# Patient Record
Sex: FEMALE | Race: WHITE | Employment: UNEMPLOYED | URBAN - METROPOLITAN AREA
[De-identification: names, ages, dates, MRNs, and addresses within clinical notes are randomized per-mention and may not be internally consistent; named-entity substitution may affect disease eponyms.]

---

## 2017-10-24 ENCOUNTER — HOSPITAL ENCOUNTER (OUTPATIENT)
Dept: MRI IMAGING | Age: 32
Discharge: HOME | End: 2017-10-24
Attending: INTERNAL MEDICINE
Payer: COMMERCIAL

## 2017-10-24 ENCOUNTER — LAB ENCOUNTER (OUTPATIENT)
Dept: LAB | Age: 32
End: 2017-10-24
Attending: INTERNAL MEDICINE
Payer: COMMERCIAL

## 2017-10-24 DIAGNOSIS — K86.1 CHRONIC PANCREATITIS, UNSPECIFIED PANCREATITIS TYPE (HCC): ICD-10-CM

## 2017-10-24 DIAGNOSIS — R10.84 GENERALIZED ABDOMINAL PAIN: ICD-10-CM

## 2017-10-24 PROCEDURE — 74183 MRI ABD W/O CNTR FLWD CNTR: CPT | Performed by: INTERNAL MEDICINE

## 2017-10-24 PROCEDURE — 83516 IMMUNOASSAY NONANTIBODY: CPT

## 2017-10-24 PROCEDURE — 86255 FLUORESCENT ANTIBODY SCREEN: CPT

## 2017-10-24 PROCEDURE — 82787 IGG 1 2 3 OR 4 EACH: CPT

## 2017-10-24 PROCEDURE — A9575 INJ GADOTERATE MEGLUMI 0.1ML: HCPCS | Performed by: INTERNAL MEDICINE

## 2017-10-24 PROCEDURE — 36415 COLL VENOUS BLD VENIPUNCTURE: CPT

## 2017-10-24 PROCEDURE — 82784 ASSAY IGA/IGD/IGG/IGM EACH: CPT

## 2017-10-24 PROCEDURE — 76376 3D RENDER W/INTRP POSTPROCES: CPT | Performed by: INTERNAL MEDICINE

## 2017-10-24 PROCEDURE — 80053 COMPREHEN METABOLIC PANEL: CPT

## 2017-10-24 PROCEDURE — 85025 COMPLETE CBC W/AUTO DIFF WBC: CPT

## 2017-10-26 NOTE — PROGRESS NOTES
To: Juliann Swanson  : 83    Date: 10/26/17    I hope this letter finds you doing well. I am writing to inform you of the following:          The results of your recent lab tests were normal.     The MRI showed : 1. Stable pericardial cyst within th

## 2017-11-01 PROCEDURE — 87491 CHLMYD TRACH DNA AMP PROBE: CPT | Performed by: OBSTETRICS & GYNECOLOGY

## 2017-11-01 PROCEDURE — 88175 CYTOPATH C/V AUTO FLUID REDO: CPT | Performed by: OBSTETRICS & GYNECOLOGY

## 2017-11-01 PROCEDURE — 87591 N.GONORRHOEAE DNA AMP PROB: CPT | Performed by: OBSTETRICS & GYNECOLOGY

## 2018-12-21 PROCEDURE — 86900 BLOOD TYPING SEROLOGIC ABO: CPT | Performed by: EMERGENCY MEDICINE

## 2018-12-21 PROCEDURE — 86901 BLOOD TYPING SEROLOGIC RH(D): CPT | Performed by: EMERGENCY MEDICINE

## 2019-01-07 PROCEDURE — 86850 RBC ANTIBODY SCREEN: CPT | Performed by: OBSTETRICS & GYNECOLOGY

## 2019-01-07 PROCEDURE — 87086 URINE CULTURE/COLONY COUNT: CPT | Performed by: OBSTETRICS & GYNECOLOGY

## 2019-01-07 PROCEDURE — 88175 CYTOPATH C/V AUTO FLUID REDO: CPT | Performed by: OBSTETRICS & GYNECOLOGY

## 2019-01-07 PROCEDURE — 86762 RUBELLA ANTIBODY: CPT | Performed by: OBSTETRICS & GYNECOLOGY

## 2019-01-07 PROCEDURE — 86901 BLOOD TYPING SEROLOGIC RH(D): CPT | Performed by: OBSTETRICS & GYNECOLOGY

## 2019-01-07 PROCEDURE — 87389 HIV-1 AG W/HIV-1&-2 AB AG IA: CPT | Performed by: OBSTETRICS & GYNECOLOGY

## 2019-01-07 PROCEDURE — 86900 BLOOD TYPING SEROLOGIC ABO: CPT | Performed by: OBSTETRICS & GYNECOLOGY

## 2019-01-07 PROCEDURE — 86780 TREPONEMA PALLIDUM: CPT | Performed by: OBSTETRICS & GYNECOLOGY

## 2019-01-21 PROBLEM — Z67.91 RH NEGATIVE, ANTEPARTUM: Status: ACTIVE | Noted: 2019-01-21

## 2019-01-21 PROBLEM — E07.9 THYROID DYSFUNCTION IN PREGNANCY: Status: ACTIVE | Noted: 2019-01-21

## 2019-01-21 PROBLEM — E07.9 THYROID DYSFUNCTION IN PREGNANCY (HCC): Status: ACTIVE | Noted: 2019-01-21

## 2019-01-21 PROBLEM — Z34.00 ENCOUNTER FOR SUPERVISION PREGNANCY IN PRIMIGRAVIDA, ANTEPARTUM (HCC): Status: ACTIVE | Noted: 2019-01-21

## 2019-01-21 PROBLEM — O99.280 THYROID DYSFUNCTION IN PREGNANCY (HCC): Status: ACTIVE | Noted: 2019-01-21

## 2019-01-21 PROBLEM — Z67.91 RH NEGATIVE, ANTEPARTUM (HCC): Status: ACTIVE | Noted: 2019-01-21

## 2019-01-21 PROBLEM — O99.280 THYROID DYSFUNCTION IN PREGNANCY: Status: ACTIVE | Noted: 2019-01-21

## 2019-01-21 PROBLEM — O26.899 RH NEGATIVE, ANTEPARTUM (HCC): Status: ACTIVE | Noted: 2019-01-21

## 2019-01-21 PROBLEM — O26.899 RH NEGATIVE, ANTEPARTUM: Status: ACTIVE | Noted: 2019-01-21

## 2019-01-21 PROBLEM — Z34.00 ENCOUNTER FOR SUPERVISION PREGNANCY IN PRIMIGRAVIDA, ANTEPARTUM: Status: ACTIVE | Noted: 2019-01-21

## 2019-01-21 PROBLEM — O99.210 OBESITY AFFECTING PREGNANCY, ANTEPARTUM: Status: ACTIVE | Noted: 2019-01-21

## 2019-01-21 PROBLEM — O99.210 OBESITY AFFECTING PREGNANCY, ANTEPARTUM (HCC): Status: ACTIVE | Noted: 2019-01-21

## 2019-02-12 PROCEDURE — 81508 FTL CGEN ABNOR TWO PROTEINS: CPT | Performed by: OBSTETRICS & GYNECOLOGY

## 2019-02-12 PROCEDURE — 36415 COLL VENOUS BLD VENIPUNCTURE: CPT | Performed by: OBSTETRICS & GYNECOLOGY

## 2019-02-14 ENCOUNTER — TELEPHONE (OUTPATIENT)
Dept: PERINATAL CARE | Facility: HOSPITAL | Age: 34
End: 2019-02-14

## 2019-02-14 NOTE — TELEPHONE ENCOUNTER
Called patient to review FTS results. She was unavailable so I left a voicemail to call our office back. I did NOT leave the results on her voicemail as the T-21 risk is elevated (1:206). Carry Rex. Dorrine Hammans, M.D.   Maternal-Fetal Medicine

## 2019-03-18 PROCEDURE — 82105 ALPHA-FETOPROTEIN SERUM: CPT | Performed by: OBSTETRICS & GYNECOLOGY

## 2019-03-18 PROCEDURE — 36415 COLL VENOUS BLD VENIPUNCTURE: CPT | Performed by: OBSTETRICS & GYNECOLOGY

## 2019-05-25 PROCEDURE — 87389 HIV-1 AG W/HIV-1&-2 AB AG IA: CPT | Performed by: OBSTETRICS & GYNECOLOGY

## 2019-08-26 PROBLEM — O26.899 RH NEGATIVE, ANTEPARTUM (HCC): Status: RESOLVED | Noted: 2019-01-21 | Resolved: 2019-08-24

## 2019-08-26 PROBLEM — Z34.00 ENCOUNTER FOR SUPERVISION PREGNANCY IN PRIMIGRAVIDA, ANTEPARTUM: Status: RESOLVED | Noted: 2019-01-21 | Resolved: 2019-08-24

## 2019-08-26 PROBLEM — E07.9 THYROID DYSFUNCTION IN PREGNANCY: Status: RESOLVED | Noted: 2019-01-21 | Resolved: 2019-08-24

## 2019-08-26 PROBLEM — O99.280 THYROID DYSFUNCTION IN PREGNANCY: Status: RESOLVED | Noted: 2019-01-21 | Resolved: 2019-08-24

## 2019-08-26 PROBLEM — O99.210 OBESITY AFFECTING PREGNANCY, ANTEPARTUM: Status: RESOLVED | Noted: 2019-01-21 | Resolved: 2019-08-24

## 2019-08-26 PROBLEM — Z67.91 RH NEGATIVE, ANTEPARTUM: Status: RESOLVED | Noted: 2019-01-21 | Resolved: 2019-08-24

## 2019-08-26 PROBLEM — O26.899 RH NEGATIVE, ANTEPARTUM: Status: RESOLVED | Noted: 2019-01-21 | Resolved: 2019-08-24

## 2019-08-26 PROBLEM — E07.9 THYROID DYSFUNCTION IN PREGNANCY (HCC): Status: RESOLVED | Noted: 2019-01-21 | Resolved: 2019-08-24

## 2019-08-26 PROBLEM — Z34.00 ENCOUNTER FOR SUPERVISION PREGNANCY IN PRIMIGRAVIDA, ANTEPARTUM (HCC): Status: RESOLVED | Noted: 2019-01-21 | Resolved: 2019-08-24

## 2019-08-26 PROBLEM — O99.280 THYROID DYSFUNCTION IN PREGNANCY (HCC): Status: RESOLVED | Noted: 2019-01-21 | Resolved: 2019-08-24

## 2019-08-26 PROBLEM — Z67.91 RH NEGATIVE, ANTEPARTUM (HCC): Status: RESOLVED | Noted: 2019-01-21 | Resolved: 2019-08-24

## 2019-08-26 PROBLEM — O99.210 OBESITY AFFECTING PREGNANCY, ANTEPARTUM (HCC): Status: RESOLVED | Noted: 2019-01-21 | Resolved: 2019-08-24

## 2020-07-21 ENCOUNTER — HOSPITAL ENCOUNTER (OUTPATIENT)
Age: 35
Discharge: HOME | End: 2020-07-21
Payer: COMMERCIAL

## 2020-07-21 VITALS
HEART RATE: 85 BPM | BODY MASS INDEX: 32.93 KG/M2 | TEMPERATURE: 98 F | WEIGHT: 230 LBS | DIASTOLIC BLOOD PRESSURE: 84 MMHG | SYSTOLIC BLOOD PRESSURE: 126 MMHG | RESPIRATION RATE: 20 BRPM | OXYGEN SATURATION: 99 % | HEIGHT: 70 IN

## 2020-07-21 DIAGNOSIS — J01.10 ACUTE NON-RECURRENT FRONTAL SINUSITIS: Primary | ICD-10-CM

## 2020-07-21 PROCEDURE — 99204 OFFICE O/P NEW MOD 45 MIN: CPT

## 2020-07-21 PROCEDURE — 99213 OFFICE O/P EST LOW 20 MIN: CPT

## 2020-07-21 RX ORDER — AMOXICILLIN AND CLAVULANATE POTASSIUM 875; 125 MG/1; MG/1
1 TABLET, FILM COATED ORAL 2 TIMES DAILY
Qty: 20 TABLET | Refills: 0 | Status: SHIPPED | OUTPATIENT
Start: 2020-07-21 | End: 2020-07-31

## 2020-07-21 NOTE — ED PROVIDER NOTES
Patient Seen in: [facility] [place] [facility]      History   Patient presents with:  Ear Pain    Stated Complaint: L ear pain x 3 days    HPI    41-year-old female presents to the clinic for evaluation of left ear pain for 3 days.   Patient also r Smokeless tobacco: Never Used      Tobacco comment: smoked less than half a pack per day    Alcohol use: No      Alcohol/week: 0.0 standard drinks    Drug use:  No             Review of Systems    Positive for stated complaint: L ear pain x 3 days  Other days.  Currently afebrile nontoxic-appearing. Minimal tenderness on exam.  Informed patient that this is likely a viral infection. Discussed with patient that usually antibiotics to use after 7 to 10 days of symptoms with no improvement or fever.   She ve

## 2020-07-22 PROCEDURE — 88305 TISSUE EXAM BY PATHOLOGIST: CPT | Performed by: OBSTETRICS & GYNECOLOGY

## 2020-11-23 PROBLEM — N20.0 NEPHROLITHIASIS: Status: ACTIVE | Noted: 2020-11-23

## 2021-11-04 PROBLEM — Z67.91 RH NEGATIVE STATE IN ANTEPARTUM PERIOD (HCC): Status: ACTIVE | Noted: 2021-11-04

## 2021-11-04 PROBLEM — O26.899 RH NEGATIVE STATE IN ANTEPARTUM PERIOD (HCC): Status: ACTIVE | Noted: 2021-11-04

## 2021-11-04 PROBLEM — Z67.91 RH NEGATIVE STATE IN ANTEPARTUM PERIOD: Status: ACTIVE | Noted: 2021-11-04

## 2021-11-04 PROBLEM — O26.899 RH NEGATIVE STATE IN ANTEPARTUM PERIOD: Status: ACTIVE | Noted: 2021-11-04

## 2021-11-04 PROBLEM — O09.529 ANTEPARTUM MULTIGRAVIDA OF ADVANCED MATERNAL AGE: Status: ACTIVE | Noted: 2021-11-04

## 2021-11-04 PROBLEM — O09.529 ANTEPARTUM MULTIGRAVIDA OF ADVANCED MATERNAL AGE (HCC): Status: ACTIVE | Noted: 2021-11-04

## 2022-03-10 PROBLEM — D80.3: Status: ACTIVE | Noted: 2022-03-10

## 2022-03-10 PROBLEM — M54.6 CHRONIC THORACIC BACK PAIN: Status: ACTIVE | Noted: 2022-03-10

## 2022-03-10 PROBLEM — G89.29 CHRONIC THORACIC BACK PAIN: Status: ACTIVE | Noted: 2022-03-10

## 2022-08-16 ENCOUNTER — HOSPITAL ENCOUNTER (OUTPATIENT)
Age: 37
Discharge: HOME | End: 2022-08-16
Payer: COMMERCIAL

## 2022-08-16 ENCOUNTER — APPOINTMENT (OUTPATIENT)
Dept: GENERAL RADIOLOGY | Age: 37
End: 2022-08-16
Attending: NURSE PRACTITIONER
Payer: COMMERCIAL

## 2022-08-16 VITALS
RESPIRATION RATE: 16 BRPM | DIASTOLIC BLOOD PRESSURE: 74 MMHG | SYSTOLIC BLOOD PRESSURE: 124 MMHG | HEIGHT: 70 IN | TEMPERATURE: 98 F | OXYGEN SATURATION: 99 % | WEIGHT: 239 LBS | BODY MASS INDEX: 34.22 KG/M2 | HEART RATE: 59 BPM

## 2022-08-16 DIAGNOSIS — S09.92XA INJURY OF NOSE, INITIAL ENCOUNTER: Primary | ICD-10-CM

## 2022-08-16 PROCEDURE — 70160 X-RAY EXAM OF NASAL BONES: CPT | Performed by: NURSE PRACTITIONER

## 2022-08-16 PROCEDURE — 99213 OFFICE O/P EST LOW 20 MIN: CPT

## 2022-08-16 NOTE — ED INITIAL ASSESSMENT (HPI)
Patient presents to IC with c/o \"head butt\"to left bridge of nose from her 1 yr old about 2 hours ago. No LOC noted. Patient states she did hear a \"crunch-like\"sound when it occurred.

## 2023-09-15 ENCOUNTER — APPOINTMENT (OUTPATIENT)
Dept: ULTRASOUND IMAGING | Age: 38
End: 2023-09-15
Attending: EMERGENCY MEDICINE
Payer: COMMERCIAL

## 2023-09-15 ENCOUNTER — APPOINTMENT (OUTPATIENT)
Dept: CT IMAGING | Age: 38
End: 2023-09-15
Attending: EMERGENCY MEDICINE
Payer: COMMERCIAL

## 2023-09-15 ENCOUNTER — HOSPITAL ENCOUNTER (EMERGENCY)
Age: 38
Discharge: HOME | End: 2023-09-15
Attending: EMERGENCY MEDICINE
Payer: COMMERCIAL

## 2023-09-15 VITALS
HEIGHT: 70 IN | TEMPERATURE: 98 F | SYSTOLIC BLOOD PRESSURE: 115 MMHG | RESPIRATION RATE: 16 BRPM | WEIGHT: 230 LBS | OXYGEN SATURATION: 98 % | HEART RATE: 65 BPM | DIASTOLIC BLOOD PRESSURE: 70 MMHG | BODY MASS INDEX: 32.93 KG/M2

## 2023-09-15 DIAGNOSIS — N83.202 CYST OF LEFT OVARY: Primary | ICD-10-CM

## 2023-09-15 DIAGNOSIS — K52.9 COLITIS: ICD-10-CM

## 2023-09-15 LAB
ALBUMIN SERPL-MCNC: 3.4 G/DL (ref 3.4–5)
ALBUMIN/GLOB SERPL: 0.9 {RATIO} (ref 1–2)
ALP LIVER SERPL-CCNC: 64 U/L
ALT SERPL-CCNC: 20 U/L
ANION GAP SERPL CALC-SCNC: 2 MMOL/L (ref 0–18)
AST SERPL-CCNC: 18 U/L (ref 15–37)
B-HCG UR QL: NEGATIVE
BASOPHILS # BLD AUTO: 0.01 X10(3) UL (ref 0–0.2)
BASOPHILS NFR BLD AUTO: 0.3 %
BILIRUB SERPL-MCNC: 0.5 MG/DL (ref 0.1–2)
BILIRUB UR QL STRIP.AUTO: NEGATIVE
BUN BLD-MCNC: 12 MG/DL (ref 7–18)
CALCIUM BLD-MCNC: 8.9 MG/DL (ref 8.5–10.1)
CHLORIDE SERPL-SCNC: 111 MMOL/L (ref 98–112)
CLARITY UR REFRACT.AUTO: CLEAR
CO2 SERPL-SCNC: 27 MMOL/L (ref 21–32)
COLOR UR AUTO: YELLOW
CREAT BLD-MCNC: 0.68 MG/DL
EGFRCR SERPLBLD CKD-EPI 2021: 114 ML/MIN/1.73M2 (ref 60–?)
EOSINOPHIL # BLD AUTO: 0.05 X10(3) UL (ref 0–0.7)
EOSINOPHIL NFR BLD AUTO: 1.6 %
ERYTHROCYTE [DISTWIDTH] IN BLOOD BY AUTOMATED COUNT: 12.5 %
GLOBULIN PLAS-MCNC: 3.6 G/DL (ref 2.8–4.4)
GLUCOSE BLD-MCNC: 102 MG/DL (ref 70–99)
GLUCOSE UR STRIP.AUTO-MCNC: NEGATIVE MG/DL
HCT VFR BLD AUTO: 40.7 %
HGB BLD-MCNC: 13.8 G/DL
IMM GRANULOCYTES # BLD AUTO: 0 X10(3) UL (ref 0–1)
IMM GRANULOCYTES NFR BLD: 0 %
KETONES UR STRIP.AUTO-MCNC: NEGATIVE MG/DL
LIPASE SERPL-CCNC: 133 U/L (ref 13–75)
LYMPHOCYTES # BLD AUTO: 1.22 X10(3) UL (ref 1–4)
LYMPHOCYTES NFR BLD AUTO: 39.4 %
MCH RBC QN AUTO: 30.9 PG (ref 26–34)
MCHC RBC AUTO-ENTMCNC: 33.9 G/DL (ref 31–37)
MCV RBC AUTO: 91.3 FL
MONOCYTES # BLD AUTO: 0.36 X10(3) UL (ref 0.1–1)
MONOCYTES NFR BLD AUTO: 11.6 %
NEUTROPHILS # BLD AUTO: 1.46 X10 (3) UL (ref 1.5–7.7)
NEUTROPHILS # BLD AUTO: 1.46 X10(3) UL (ref 1.5–7.7)
NEUTROPHILS NFR BLD AUTO: 47.1 %
NITRITE UR QL STRIP.AUTO: NEGATIVE
OSMOLALITY SERPL CALC.SUM OF ELEC: 290 MOSM/KG (ref 275–295)
PH UR STRIP.AUTO: 7.5 [PH] (ref 5–8)
PLATELET # BLD AUTO: 232 10(3)UL (ref 150–450)
POTASSIUM SERPL-SCNC: 4.6 MMOL/L (ref 3.5–5.1)
PROT SERPL-MCNC: 7 G/DL (ref 6.4–8.2)
PROT UR STRIP.AUTO-MCNC: NEGATIVE MG/DL
RBC # BLD AUTO: 4.46 X10(6)UL
RBC UR QL AUTO: NEGATIVE
SODIUM SERPL-SCNC: 140 MMOL/L (ref 136–145)
SP GR UR STRIP.AUTO: 1.02 (ref 1–1.03)
UROBILINOGEN UR STRIP.AUTO-MCNC: 0.2 MG/DL
WBC # BLD AUTO: 3.1 X10(3) UL (ref 4–11)

## 2023-09-15 PROCEDURE — 99285 EMERGENCY DEPT VISIT HI MDM: CPT

## 2023-09-15 PROCEDURE — 74177 CT ABD & PELVIS W/CONTRAST: CPT | Performed by: EMERGENCY MEDICINE

## 2023-09-15 PROCEDURE — 83690 ASSAY OF LIPASE: CPT

## 2023-09-15 PROCEDURE — 80053 COMPREHEN METABOLIC PANEL: CPT | Performed by: EMERGENCY MEDICINE

## 2023-09-15 PROCEDURE — 93975 VASCULAR STUDY: CPT | Performed by: EMERGENCY MEDICINE

## 2023-09-15 PROCEDURE — 76856 US EXAM PELVIC COMPLETE: CPT | Performed by: EMERGENCY MEDICINE

## 2023-09-15 PROCEDURE — 81001 URINALYSIS AUTO W/SCOPE: CPT | Performed by: EMERGENCY MEDICINE

## 2023-09-15 PROCEDURE — 96361 HYDRATE IV INFUSION ADD-ON: CPT

## 2023-09-15 PROCEDURE — 96375 TX/PRO/DX INJ NEW DRUG ADDON: CPT

## 2023-09-15 PROCEDURE — 83690 ASSAY OF LIPASE: CPT | Performed by: EMERGENCY MEDICINE

## 2023-09-15 PROCEDURE — 81001 URINALYSIS AUTO W/SCOPE: CPT

## 2023-09-15 PROCEDURE — 81025 URINE PREGNANCY TEST: CPT

## 2023-09-15 PROCEDURE — 80053 COMPREHEN METABOLIC PANEL: CPT

## 2023-09-15 PROCEDURE — 85025 COMPLETE CBC W/AUTO DIFF WBC: CPT

## 2023-09-15 PROCEDURE — 81015 MICROSCOPIC EXAM OF URINE: CPT

## 2023-09-15 PROCEDURE — 96374 THER/PROPH/DIAG INJ IV PUSH: CPT

## 2023-09-15 PROCEDURE — 85025 COMPLETE CBC W/AUTO DIFF WBC: CPT | Performed by: EMERGENCY MEDICINE

## 2023-09-15 RX ORDER — KETOROLAC TROMETHAMINE 15 MG/ML
30 INJECTION, SOLUTION INTRAMUSCULAR; INTRAVENOUS ONCE
Status: COMPLETED | OUTPATIENT
Start: 2023-09-15 | End: 2023-09-15

## 2023-09-15 RX ORDER — ONDANSETRON 2 MG/ML
4 INJECTION INTRAMUSCULAR; INTRAVENOUS ONCE
Status: COMPLETED | OUTPATIENT
Start: 2023-09-15 | End: 2023-09-15

## 2023-09-15 RX ORDER — CETIRIZINE HYDROCHLORIDE 10 MG/1
10 TABLET ORAL DAILY
COMMUNITY

## 2023-09-15 NOTE — DISCHARGE INSTRUCTIONS
Drink plenty of fluids. You can use anti-inflammatories such as ibuprofen for the pain from the ovarian cyst.  Return if you develop any worrisome symptoms such as inability to stay hydrated, severe intractable pain, etc. otherwise follow-up with your OB/GYN and PCP as an outpatient.

## 2024-03-13 ENCOUNTER — NURSE ONLY (OUTPATIENT)
Dept: HEMATOLOGY/ONCOLOGY | Age: 39
End: 2024-03-13
Attending: GENETIC COUNSELOR, MS
Payer: COMMERCIAL

## 2024-03-13 ENCOUNTER — GENETICS ENCOUNTER (OUTPATIENT)
Dept: GENETICS | Age: 39
End: 2024-03-13
Attending: GENETIC COUNSELOR, MS
Payer: COMMERCIAL

## 2024-03-13 DIAGNOSIS — Z80.3 FAMILY HISTORY OF BREAST CANCER: Primary | ICD-10-CM

## 2024-03-13 PROCEDURE — 36415 COLL VENOUS BLD VENIPUNCTURE: CPT

## 2024-03-13 PROCEDURE — 96040 HC GENETIC COUNSELING EA 30 MIN: CPT | Performed by: GENETIC COUNSELOR, MS

## 2024-03-13 NOTE — PROGRESS NOTES
Referring Provider:  Staci Hays MD (fax: [phone])    Additional Provider(s):  David Cook MD (fax: [phone])    Reason for Referral:  Nya Curiel was referred for genetic counseling because of a family history of breast cancer. Ms. Curiel is a 38 year-old woman of unspecified  descent who has no personal history of cancer. 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 Her first episode of pancreatitis was in  but the etiology was not diagnosed until some years later at [facility]. Ms. Curiel has lithotripsy for renal stones, most recently in . Ms. Curiel's ovaries are intact; she is pre-menopausal. Menarche was at age 11. Ms. Curiel's lat mammogram was on 21. Ms. Curiel had a reportedly unremarkable colonoscopy in .    Social History:  Ms. Curiel was seen today by herself. Ms. Curiel lives in [place].     Family History:   A three generation pedigree was obtained.      Ms. Curiel has two sons, ages four years and almost two years.     Ms. Curiel is an only child.      Ms. Curiel's mother is 73 years-old. Ms. Curiel's mother was diagnosed with multifocal ER/MO-positive, HER2-negative invasive lobular carcinoma of the right breast at age 72. Ms. Curiel's mother had a bilateral mastectomy. Ms. Curiel's mother had blood drawn on 22 for [facility] hereditary cancer test. No known pathogenic variants were detected in 81 genes including GINGER, BARD1, BRCA1, BRCA2, BRIP1, CHEK2, CDH1, PALB2, PTEN, RAD51C, RAD51D, STK11, and TP53. Ms. Curiel provided me with a copy of her mother's report from [facility] (Case File ID [id number]) to review. Ms. Curiel's mother has two younger sisters and no brothers. Neither of Ms. Curiel's maternal aunts have had cancer. One of Ms. Curiel's maternal cousins  at age 30 from an unspecified cancer attributed to a biologic medication she was prescribed for colitis. Ms. Curiel's maternal grandmother was diagnosed with  an ER-positive, HER2-negative right breast cancer at age 89 and follicular lymphoma at age 91. Ms. Curiel's maternal grandfather had cutaneous melanoma in his 70s and  in his 80s from non-cancer-related causes.     Ms. Curiel's father  at age 63 from complications of two neurological disorders, spinocerebellar degeneration with ataxia and spastic paraparesis; Ms. Curiel's father did not have a history of cancer. Ms. Hes father had two sisters and no brothers. One of Ms. Curiel's paternal aunts was diagnosed with breast cancer at age 67 and  at age 73 from heart damage attributed to chemotherapy. Ms. Curiel's other paternal aunt  in her 60s; she had multiple myeloma and spastic paraparesis. Ms. Curiel's paternal grandmother was diagnosed with breast cancer in her early 40s; she  at age 62 from complications of a stroke. Ms. Curiel's paternal grandmother Ms. Curiel's paternal grandfather had prostate cancer in his mid-60s and spastic paraparesis; he  in his 70s.     Please see the pedigree for additional family history information.     Counseling:   The following information was discussed with Ms. Curiel.    Genetics of Cancer:  The majority of cancers are sporadic whereas approximately 10-30% of cases of cancer cases are attributed to familial factors such as unidentified low penetrance genes in the family or shared environmental factors.  Approximately 5-10% of cancers are related to a hereditary cancer syndrome.  Signs of a hereditary cancer syndrome include some rare cancers, common cancers occurring at unusually young ages, multiple primary cancers in the some individual, or the same type of cancer or related cancers (e.g., breast and ovarian, colorectal and endometrial) in three or more individuals in the same lineage.      Although the majority of breast cancer cases are sporadic, approximately 5-10% of women with breast cancer have a hereditary cancer syndrome. Mutations in the  genes, BRCA1 and BRCA2, account for the majority of hereditary breast and ovarian cancer families.  Mutations in genes other than BRCA1/2, many of which now have medical management recommendations (e.g., GINGER, CHEK2, PALB2) are identified in 3-10% of individuals tested using a multigene panel.      Risk Assessment:   Ms. Curiel meets NCCN Guidelines testing criteria for high-penetrance breast cancer susceptibility genes based on her reported paternal history of early-onset breast cancer in her paternal grandmother. Risk assessment to estimate the chance of Ms. Curiel harboring a paternally inherited BRCA1/2 pathogenic variant was done by using the Sami II Model. Based on this model, Ms. Curiel's risk of carrying a BRCA1/2 pathogenic variant is estimated to be 4%. It is important to note that all prediction models have limitations and medical management should be based on clinical judgment, and personal and family history. In addition, there are no prediction models or testing criteria for moderate penetrance cancer predisposition genes such as GINGER and CHEK2. I recommend that testing be performed as part of a multigene panel.     Ms. Curiel reports that her personal history of acute, recurrent pancreatitis is attributed to autoimmune factors. We briefly discussed that a small percentage of individuals with acute, recurrent pancreatitis have hereditary pancreatitis. Hereditary pancreatitis usually begins in childhood and is an autosomal dominant condition caused by mutations in the major pancreatic gene PRSS1. Pathogenic variants in other genes including SPINK1 and CFTR have also been associated with an increased risk for hereditary pancreatitis.      Ms. Curiel stated that she is not interested in genetic testing for neurological genetic conditions such as spinocerebellar degeneration with ataxia and spastic paraparesis and this was not discussed.    Genetic Testing (Panel):  The pros, cons, and limitations of genetic  testing for hereditary cancer predisposition syndromes with or without hereditary pancreatitis genes were discussed including the potential implications of test results on clinical management.     If a pathogenic variant is not identified (negative result), it is still possible that Ms. Curiel has a pathogenic variant in one of these genes that was not detected by the genetic test, or that the family is dealing with a hereditary cancer syndrome involving a different gene. It is also possible that Ms. Curiel's paternal relatives have a pathogenic variant in one of these genes that Ms. Curiel did not inherit. In this scenario, options for cancer screening/management should be determined according to personal and family histories and should be discussed with a physician.      A variant of uncertain significance is a DNA change that may or may not alter the function of the gene; therefore, it is usually not possible to determine if the gene variant is responsible for an individual's increased cancer risk.     If Ms. Curiel is found to carry a pathogenic variant in a cancer predisposition gene, she is at significantly increased risk for various cancers. The magnitude of these risks, and the cancers for which she is at increased risk would depend on the gene involved. Medical recommendations for individuals with BRCA1/2 pathogenic variants were reviewed as an example. It was also explained that for some of the genes for which testing is available, the associated cancer risks have yet to be determined and medical management recommendations may not yet be available for individuals with pathogenic variants in these genes. If she were to test positive for a pathogenic variant, her children and siblings would each have a 50% chance of carrying the same variant. At-risk adults (>18) would have the option of pursuing targeted genetic testing to clarify their cancer risks. Genetic test results have implications for the entire  biological family. Thus, it is recommended that she share her genetic test results with her biological family members so that they may have their risk assessed.     Genetic Information Non-Discrimination Act:  The legal protections of the Genetic Information Nondiscrimination Act (TROY) for health insurance and employment were discussed.  TROY does not provide protection for life insurance, disability or long-term care insurance.    Summary and Plan:  Ms. Curiel was referred for genetic counseling because of a family history of breat cancer. Her reported family history is somewhat suspicious for a hereditary cancer syndrome. Genetic testing on Ms. Curiel for BRCA1/2 pathogenic variants as part of a multigene panel is indicated based on her reported paternal family history.      At the conclusion of the counseling session Ms. Curiel decided to proceed with genetic testing. Written consent was obtained. Blood and paperwork were sent to [facility] for their Breast/Gyn Guidelines panel. I anticipate that Ms. Curiel's results will be available within 2-3 weeks and will call her with the results.  Results will also be communicated to Dr. Hays and Dr. Cook.    Approximately 40 minutes was spent in consultation with Ms. Curiel.

## 2024-03-22 ENCOUNTER — GENETICS ENCOUNTER (OUTPATIENT)
Dept: HEMATOLOGY/ONCOLOGY | Facility: HOSPITAL | Age: 39
End: 2024-03-22

## 2024-03-22 NOTE — PROGRESS NOTES
Referring Provider:                    Staci Hays MD (fax: [phone])     Additional Provider(s):              David Cook MD (fax: [phone])    Reason for Referral:  Nya Curiel had genetic testing performed on 03/13/24 because of a family history of breast cancer.     Genetic Testing Result:  NEGATIVE - No known pathogenic variants were found in the following 19 genes: GINGER, BARD1, BRCA1, BRCA2, BRIP1, CDH1, CHEK2, MLH1, MSH2 (including EPCAM rearrangement testing), MSH6, NF1, PALB2, PMS2, PTEN, RAD51C, RAD51D, STK11, and TP53. Please refer to the report from [employer] ([id number]) for additional testing information. These results were discussed with Ms. Curiel by phone on 03/21/24.    Summary and Plan:  These results indicate that it is unlikely that Ms. Curiel has a pathogenic variant in any of the genes listed above. The limitations of the testing include the chance that a pathogenic variant in a gene other than those included in this analysis might be the cause of cancer in Ms. Curiel's relatives. I encourage Ms. Curiel to contact me on an annual basis to see if there have been any updates in genetic testing that would apply to her or to inform me if there are any changes to the family history.    In the meantime, Ms. Curiel and her relatives should speak with their physicians to discuss recommended medical management according to their personal and family history. Ms. Hes estimated lifetime risk for breast cancer remains elevated over that of the general population based on her family history. Ms. Hes lifetime risk for breast cancer is estimated to be 35.7% based on Tyrer-Cuzick model, version 8. Women with a >20% estimated lifetime risk for breast cancer are recommended to consider increased breast cancer surveillance including annual screening breast MRI.  I encouraged Ms. Curiel to discuss the pros and cons of increased breast cancer surveillance with her medical providers.       Cc:  Nya Curiel

## 2024-05-23 ENCOUNTER — APPOINTMENT (OUTPATIENT)
Dept: ULTRASOUND IMAGING | Age: 39
End: 2024-05-23
Attending: EMERGENCY MEDICINE

## 2024-05-23 ENCOUNTER — HOSPITAL ENCOUNTER (EMERGENCY)
Age: 39
Discharge: HOME | End: 2024-05-23
Attending: EMERGENCY MEDICINE

## 2024-05-23 ENCOUNTER — APPOINTMENT (OUTPATIENT)
Dept: CT IMAGING | Age: 39
End: 2024-05-23
Attending: EMERGENCY MEDICINE

## 2024-05-23 VITALS
OXYGEN SATURATION: 100 % | TEMPERATURE: 99 F | WEIGHT: 250 LBS | BODY MASS INDEX: 35.79 KG/M2 | SYSTOLIC BLOOD PRESSURE: 122 MMHG | DIASTOLIC BLOOD PRESSURE: 65 MMHG | RESPIRATION RATE: 18 BRPM | HEART RATE: 64 BPM | HEIGHT: 70 IN

## 2024-05-23 DIAGNOSIS — R10.2 PELVIC PAIN: Primary | ICD-10-CM

## 2024-05-23 LAB
ALBUMIN SERPL-MCNC: 3.6 G/DL (ref 3.4–5)
ALBUMIN/GLOB SERPL: 0.9 {RATIO} (ref 1–2)
ALP LIVER SERPL-CCNC: 69 U/L
ALT SERPL-CCNC: 19 U/L
ANION GAP SERPL CALC-SCNC: 5 MMOL/L (ref 0–18)
AST SERPL-CCNC: 28 U/L (ref 15–37)
B-HCG UR QL: NEGATIVE
BASOPHILS # BLD AUTO: 0.02 X10(3) UL (ref 0–0.2)
BASOPHILS NFR BLD AUTO: 0.2 %
BILIRUB SERPL-MCNC: 0.6 MG/DL (ref 0.1–2)
BILIRUB UR QL STRIP.AUTO: NEGATIVE
BUN BLD-MCNC: 12 MG/DL (ref 9–23)
CALCIUM BLD-MCNC: 9.3 MG/DL (ref 8.5–10.1)
CHLORIDE SERPL-SCNC: 109 MMOL/L (ref 98–112)
CLARITY UR REFRACT.AUTO: CLEAR
CO2 SERPL-SCNC: 23 MMOL/L (ref 21–32)
COLOR UR AUTO: YELLOW
CREAT BLD-MCNC: 0.84 MG/DL
EGFRCR SERPLBLD CKD-EPI 2021: 91 ML/MIN/1.73M2 (ref 60–?)
EOSINOPHIL # BLD AUTO: 0.11 X10(3) UL (ref 0–0.7)
EOSINOPHIL NFR BLD AUTO: 1.3 %
ERYTHROCYTE [DISTWIDTH] IN BLOOD BY AUTOMATED COUNT: 12.8 %
GLOBULIN PLAS-MCNC: 4.2 G/DL (ref 2.8–4.4)
GLUCOSE BLD-MCNC: 98 MG/DL (ref 70–99)
GLUCOSE UR STRIP.AUTO-MCNC: NEGATIVE MG/DL
HCT VFR BLD AUTO: 44.3 %
HGB BLD-MCNC: 15.2 G/DL
IMM GRANULOCYTES # BLD AUTO: 0.02 X10(3) UL (ref 0–1)
IMM GRANULOCYTES NFR BLD: 0.2 %
KETONES UR STRIP.AUTO-MCNC: NEGATIVE MG/DL
LEUKOCYTE ESTERASE UR QL STRIP.AUTO: NEGATIVE
LIPASE SERPL-CCNC: 137 U/L (ref 13–75)
LYMPHOCYTES # BLD AUTO: 2.34 X10(3) UL (ref 1–4)
LYMPHOCYTES NFR BLD AUTO: 28.6 %
MCH RBC QN AUTO: 31.3 PG (ref 26–34)
MCHC RBC AUTO-ENTMCNC: 34.3 G/DL (ref 31–37)
MCV RBC AUTO: 91.2 FL
MONOCYTES # BLD AUTO: 0.54 X10(3) UL (ref 0.1–1)
MONOCYTES NFR BLD AUTO: 6.6 %
NEUTROPHILS # BLD AUTO: 5.16 X10 (3) UL (ref 1.5–7.7)
NEUTROPHILS # BLD AUTO: 5.16 X10(3) UL (ref 1.5–7.7)
NEUTROPHILS NFR BLD AUTO: 63.1 %
NITRITE UR QL STRIP.AUTO: NEGATIVE
OSMOLALITY SERPL CALC.SUM OF ELEC: 284 MOSM/KG (ref 275–295)
PH UR STRIP.AUTO: 6.5 [PH] (ref 5–8)
PLATELET # BLD AUTO: 265 10(3)UL (ref 150–450)
POTASSIUM SERPL-SCNC: 4.8 MMOL/L (ref 3.5–5.1)
PROT SERPL-MCNC: 7.8 G/DL (ref 6.4–8.2)
PROT UR STRIP.AUTO-MCNC: NEGATIVE MG/DL
RBC # BLD AUTO: 4.86 X10(6)UL
RBC UR QL AUTO: NEGATIVE
SODIUM SERPL-SCNC: 137 MMOL/L (ref 136–145)
SP GR UR STRIP.AUTO: 1.02 (ref 1–1.03)
UROBILINOGEN UR STRIP.AUTO-MCNC: 0.2 MG/DL
WBC # BLD AUTO: 8.2 X10(3) UL (ref 4–11)

## 2024-05-23 PROCEDURE — 96375 TX/PRO/DX INJ NEW DRUG ADDON: CPT

## 2024-05-23 PROCEDURE — 83690 ASSAY OF LIPASE: CPT | Performed by: EMERGENCY MEDICINE

## 2024-05-23 PROCEDURE — 76856 US EXAM PELVIC COMPLETE: CPT | Performed by: EMERGENCY MEDICINE

## 2024-05-23 PROCEDURE — 80053 COMPREHEN METABOLIC PANEL: CPT | Performed by: EMERGENCY MEDICINE

## 2024-05-23 PROCEDURE — 81025 URINE PREGNANCY TEST: CPT

## 2024-05-23 PROCEDURE — 96376 TX/PRO/DX INJ SAME DRUG ADON: CPT

## 2024-05-23 PROCEDURE — 99285 EMERGENCY DEPT VISIT HI MDM: CPT

## 2024-05-23 PROCEDURE — 96374 THER/PROPH/DIAG INJ IV PUSH: CPT

## 2024-05-23 PROCEDURE — 81003 URINALYSIS AUTO W/O SCOPE: CPT | Performed by: EMERGENCY MEDICINE

## 2024-05-23 PROCEDURE — 74176 CT ABD & PELVIS W/O CONTRAST: CPT | Performed by: EMERGENCY MEDICINE

## 2024-05-23 PROCEDURE — 93975 VASCULAR STUDY: CPT | Performed by: EMERGENCY MEDICINE

## 2024-05-23 PROCEDURE — 99284 EMERGENCY DEPT VISIT MOD MDM: CPT

## 2024-05-23 PROCEDURE — 85025 COMPLETE CBC W/AUTO DIFF WBC: CPT | Performed by: EMERGENCY MEDICINE

## 2024-05-23 PROCEDURE — 96361 HYDRATE IV INFUSION ADD-ON: CPT

## 2024-05-23 PROCEDURE — 76830 TRANSVAGINAL US NON-OB: CPT | Performed by: EMERGENCY MEDICINE

## 2024-05-23 RX ORDER — HYDROMORPHONE HYDROCHLORIDE 1 MG/ML
1 INJECTION, SOLUTION INTRAMUSCULAR; INTRAVENOUS; SUBCUTANEOUS EVERY 30 MIN PRN
Status: DISCONTINUED | OUTPATIENT
Start: 2024-05-23 | End: 2024-05-24

## 2024-05-23 RX ORDER — ONDANSETRON 2 MG/ML
4 INJECTION INTRAMUSCULAR; INTRAVENOUS
Status: DISCONTINUED | OUTPATIENT
Start: 2024-05-23 | End: 2024-05-24

## 2024-05-23 RX ORDER — KETOROLAC TROMETHAMINE 30 MG/ML
30 INJECTION, SOLUTION INTRAMUSCULAR; INTRAVENOUS ONCE
Status: COMPLETED | OUTPATIENT
Start: 2024-05-23 | End: 2024-05-23

## 2024-05-23 RX ORDER — ONDANSETRON 8 MG/1
8 TABLET, ORALLY DISINTEGRATING ORAL EVERY 6 HOURS PRN
Qty: 10 TABLET | Refills: 0 | Status: SHIPPED | OUTPATIENT
Start: 2024-05-23

## 2024-05-23 RX ORDER — HYDROCODONE BITARTRATE AND ACETAMINOPHEN 5; 325 MG/1; MG/1
1-2 TABLET ORAL EVERY 6 HOURS PRN
Qty: 10 TABLET | Refills: 0 | Status: SHIPPED | OUTPATIENT
Start: 2024-05-23 | End: 2024-05-28

## 2024-05-24 NOTE — DISCHARGE INSTRUCTIONS
Push fluids  Rest  Tylenol Motrin for pain  Norco for severe pain  Zofran for nausea follow-up with your primary care provider    Contact your primary care doctor in the morning to arrange close follow-up

## 2024-05-24 NOTE — ED PROVIDER NOTES
Patient Seen in: [facility] [place]      History     Chief Complaint   Patient presents with    Abdomen/Flank Pain     Stated Complaint: abdominal pain    Subjective:   HPI  Patient with multiple medical issues including kidney stones, pancreatitis, ovarian cysts among others.  Patient complains of abdominal pain.  Patient complains of lower abdominal/suprapubic pain that started last night and has become suddenly more severe today.  She cannot localize it left or right.  Patient has nausea and vomiting.  Patient had a some migraine headache for the last 2 days.  She has a history of migraines and this migraine is similar to previous.  Patient denies any burning with urination, urgency, or blood in the urine.  Patient reports normal bowel movements.   She is unsure if she could be pregnant.    Objective:   Past Medical History:    Abdominal pain    Acute pancreatitis (HCC)    ERCP with [facility] for SOD  Dr. Michaud    ANXIETY    Anxiety state, unspecified    Autoimmune pancreatitis (HCC)    s/p Prednisone; followed with ERCP seen by [facility] (Dr. Su)    Back pain    Body piercing    CERVICAL DYSPLASIA    cin2-3    Decorative tattoo    DEPRESSION    Depression    Diarrhea, unspecified    History of depression    HOSPITALIZATIONS    auto-immune pancreatitis - has been hopitalized serveral times in the last 2.5 years    Hypothyroidism    Sees Dr Romano    Irregular bowel habits    KIDNEY STONE    Kidney stone    18: passed kidney stone 2 weeks after delivery.      (normal spontaneous vaginal delivery) (HCC)    Seasonal allergies    Wears glasses              Past Surgical History:   Procedure Laterality Date    Cholecystectomy      Colonoscopy  2012    wnl    Ct abdomen and pelvis with contrast  09/10/2019    Cystoscopy,remv calculus,complic Left 09/10/2019    Cystoscopy, left ureteroscopy, retrograde pyleogram, stone extraction, stent insetion    Ercp,diagnostic      Ir  ureter tube Left 09/10/2019    6x24 stent extraction    Leep  2003    cin2-3    Other      ankle surgery ~     Other      bunion surgery -     Other      left knee scope -     Other surgical history      neck lipoma-benign    Tonsillectomy                  Social History     Socioeconomic History    Marital status:    Tobacco Use    Smoking status: Former     Current packs/day: 0.00     Types: Cigarettes     Quit date: 2010     Years since quittin.4    Smokeless tobacco: Never    Tobacco comments:     smoked less than half a pack per day   Vaping Use    Vaping status: Never Used   Substance and Sexual Activity    Alcohol use: No     Alcohol/week: 0.0 standard drinks of alcohol    Drug use: Not Currently     Types: Cannabis    Sexual activity: Yes     Partners: Male     Comment: 2021              Review of Systems    Positive for stated complaint: abdominal pain  Other systems are as noted in HPI.  Constitutional and vital signs reviewed.      All other systems reviewed and negative except as noted above.    Physical Exam     ED Triage Vitals   BP 24 125/80   Pulse 24 83   Resp 24 18   Temp 24 98.5 °F (36.9 °C)   Temp src --    SpO2 24 99 %   O2 Device 24 2211 None (Room air)       Current Vitals:   Vital Signs  BP: 122/65  Pulse: 64  Resp: 18  Temp: 98.5 °F (36.9 °C)    Oxygen Therapy  SpO2: 100 %  O2 Device: None (Room air)            Physical Exam  General: The patient is awake, alert, conversant.  She appears in acute distress secondary pain is tearful  Eyes: sclera white, conjunctiva pink and moist.  Lids and lashes are normal.  Lungs: Clear to auscultation bilaterally.  No rhonchi or rales.  Heart: Normal S1 and S2, without murmur.    Abdomen: Soft, obese but nondistended.  Tender in the pelvis bilaterally, difficult to localize left or right.  Also significantly tender over the suprapubic abdomen.  Upper abdomen  is more benign.  Extremities: Unremarkable.  Calves nonswollen, symmetric   Skin: No rash in area patient discomfort  Neurologic:  Mental status as above.  Patient moves all extremities with good strength and coordination.           ED Course     Labs Reviewed   LIPASE - Abnormal; Notable for the following components:       Result Value    Lipase 137 (*)     All other components within normal limits   COMP METABOLIC PANEL (14) - Abnormal; Notable for the following components:    A/G Ratio 0.9 (*)     All other components within normal limits   POCT PREGNANCY URINE - Normal   URINALYSIS WITH CULTURE REFLEX   CBC WITH DIFFERENTIAL WITH PLATELET    Narrative:     The following orders were created for panel order CBC With Differential With Platelet.  Procedure                               Abnormality         Status                     ---------                               -----------         ------                     CBC W/ DIFFERENTIAL[[id number]]                              Final result                 Please view results for these tests on the individual orders.   RAINBOW DRAW LAVENDER   RAINBOW DRAW LIGHT GREEN   CBC W/ DIFFERENTIAL             Patient had ultrasound pelvis performed about 6 months ago  CONCLUSION:    1. There is a 1.9 cm left ovarian cyst.  This is simple appearing.  No sonographic evidence of ovarian torsion as clinically questioned.   2. Unremarkable sonographic appearance of the uterus and right ovary.     CT scan performed about 6 months ago:  FINDINGS:   LUNG BASE:  Unremarkable.   LIVER:  Focal fatty infiltration of the liver along the falciform ligament.   BILIARY:  Status post cholecystectomy.   SPLEEN:  Unremarkable.   PANCREAS:  Unremarkable.   ADRENALS:  Unremarkable.   KIDNEYS:  There are few scattered nonobstructing stones in the left kidney measuring up to 3 mm. There may also be a few punctate nonobstructing stones in the right kidney particularly along the right lower pole.   There is no obstructive uropathy.   AORTA/VASCULAR:  Unremarkable.   RETROPERITONEUM:  Unremarkable.   BOWEL/MESENTERY:  Unremarkable appendix.  There are scattered mild areas of colonic wall thickening that could be related to incomplete distension, with nonspecific colitis not excluded.  Clinical correlation recommended.  No large or small bowel   dilatation.  No free air.  Small amount of free fluid in the pelvis.   ABDOMINAL WALL:  Unremarkable.   PELVIC ORGANS:  There is a probable functional cyst measuring up to 2.9 cm in the left ovary that could be further assessed with dedicated pelvic ultrasound with Doppler exam as clinically directed.  Unremarkable uterus and right ovary.  Unremarkable   urinary bladder.  Pelvic phleboliths.   LYMPH NODES:  No lymphadenopathy in the abdomen or pelvis.   BONES:  Degenerative changes in the spine and hips.   OTHER:  None.         MDM        Patient with pelvic pain difficult to localize left or right.  Differential diagnosis includes ovarian pathology such as cyst or even torsion.  Diverticulitis considered in the differential but the presentation would be atypical.  Renal colic a consideration as patient has known kidney stones.  She reports that this pain is not similar to kidney stone pain that she has had previously    Patient treated with IV fluid and offered pain and nausea medicine    CBC shows normal white count, hemoglobin, and platelets  Metabolic panel shows normal glucose and electrolytes.  Creatinine normal.  LFTs normal  Lipase elevated but similar to previous.  This may be related to her history of autoimmune pancreatitis.  Pain is well into the pelvis and not typical of pancreatitis  Urinalysis shows negative blood, nitrites, leukocytes  Urine Preg negative    Ultrasound pelvis with Doppler  CONCLUSION:  A small amount of free pelvic fluid is favored.  This is nonspecific and could be physiologic in a premenopausal female.    Probable right ovarian cyst  versus corpus luteal cyst.  Consider a follow-up exam with pelvic ultrasound in approximately 3 months.     DOPPLER WAVE FORMS   FLOW:  Normal waveforms are noted of the right ovary.  The left ovary was not able to be clearly visualized.     I reviewed the results of the workup with patient and her .  As noted above, left ovary not visualized.    There was some pelvic fluid bringing up the possibility of ruptured ovarian cyst.  CT scan ordered and patient treated with additional pain medication.     CT abd  CONCLUSION:  No free fluid is seen.  There is a normal-appearing appendix.  Portions of the bowel are decompressed, limiting evaluation.  There is some questionable wall thickening of the colon although this is felt to be most likely related to   incomplete distension.  A nonspecific colitis is possible but felt to be less likely.  If clinical symptoms persist then recommend follow-up imaging.      Nonobstructing left renal calculi.     I reviewed the result of the work up with the patient. At this point, I believe patient may be safely discharged home.   I recommend:  Push fluids  Rest  Tylenol Motrin for pain  Norco for severe pain  Zofran for nausea follow-up with your primary care provider     Contact your primary care doctor in the morning to arrange close follow-up                                          Medical Decision Making      Disposition and Plan     Clinical Impression:  1. Pelvic pain         Disposition:  Discharge  5/23/2024 11:21 pm    Follow-up:  David Cook MD  [address] [address]  [phone]    Call in 1 day(s)            Medications Prescribed:  Current Discharge Medication List        START taking these medications    Details   HYDROcodone-acetaminophen 5-325 MG Oral Tab Take 1-2 tablets by mouth every 6 (six) hours as needed for Pain.  Qty: 10 tablet, Refills: 0    Associated Diagnoses: Pelvic pain      ondansetron 8 MG Oral Tablet Dispersible  Take 1 tablet (8 mg total) by mouth every 6 (six) hours as needed for Nausea.  Qty: 10 tablet, Refills: 0

## 2024-12-20 ENCOUNTER — APPOINTMENT (OUTPATIENT)
Dept: CT IMAGING | Facility: HOSPITAL | Age: 39
End: 2024-12-20
Attending: EMERGENCY MEDICINE
Payer: COMMERCIAL

## 2024-12-20 ENCOUNTER — HOSPITAL ENCOUNTER (EMERGENCY)
Facility: HOSPITAL | Age: 39
Discharge: HOME | End: 2024-12-20
Attending: EMERGENCY MEDICINE
Payer: COMMERCIAL

## 2024-12-20 VITALS
HEIGHT: 70 IN | HEART RATE: 64 BPM | SYSTOLIC BLOOD PRESSURE: 120 MMHG | RESPIRATION RATE: 16 BRPM | BODY MASS INDEX: 38.65 KG/M2 | OXYGEN SATURATION: 99 % | TEMPERATURE: 98 F | DIASTOLIC BLOOD PRESSURE: 76 MMHG | WEIGHT: 270 LBS

## 2024-12-20 DIAGNOSIS — K52.9 COLITIS: Primary | ICD-10-CM

## 2024-12-20 LAB
ALBUMIN SERPL-MCNC: 4.5 G/DL (ref 3.2–4.8)
ALBUMIN/GLOB SERPL: 1.4 {RATIO} (ref 1–2)
ALP LIVER SERPL-CCNC: 77 U/L
ALT SERPL-CCNC: 20 U/L
ANION GAP SERPL CALC-SCNC: 6 MMOL/L (ref 0–18)
AST SERPL-CCNC: 27 U/L (ref ?–34)
B-HCG UR QL: NEGATIVE
BASOPHILS # BLD AUTO: 0.02 X10(3) UL (ref 0–0.2)
BASOPHILS NFR BLD AUTO: 0.3 %
BILIRUB SERPL-MCNC: 0.6 MG/DL (ref 0.3–1.2)
BILIRUB UR QL STRIP.AUTO: NEGATIVE
BUN BLD-MCNC: 12 MG/DL (ref 9–23)
CALCIUM BLD-MCNC: 9.3 MG/DL (ref 8.7–10.4)
CHLORIDE SERPL-SCNC: 106 MMOL/L (ref 98–112)
CLARITY UR REFRACT.AUTO: CLEAR
CO2 SERPL-SCNC: 26 MMOL/L (ref 21–32)
CREAT BLD-MCNC: 0.82 MG/DL
EGFRCR SERPLBLD CKD-EPI 2021: 93 ML/MIN/1.73M2 (ref 60–?)
EOSINOPHIL # BLD AUTO: 0.07 X10(3) UL (ref 0–0.7)
EOSINOPHIL NFR BLD AUTO: 1.1 %
ERYTHROCYTE [DISTWIDTH] IN BLOOD BY AUTOMATED COUNT: 12.6 %
GLOBULIN PLAS-MCNC: 3.3 G/DL (ref 2–3.5)
GLUCOSE BLD-MCNC: 95 MG/DL (ref 70–99)
GLUCOSE UR STRIP.AUTO-MCNC: NORMAL MG/DL
HCT VFR BLD AUTO: 42 %
HGB BLD-MCNC: 14.3 G/DL
IMM GRANULOCYTES # BLD AUTO: 0.02 X10(3) UL (ref 0–1)
IMM GRANULOCYTES NFR BLD: 0.3 %
KETONES UR STRIP.AUTO-MCNC: NEGATIVE MG/DL
LEUKOCYTE ESTERASE UR QL STRIP.AUTO: NEGATIVE
LIPASE SERPL-CCNC: 96 U/L (ref 12–53)
LYMPHOCYTES # BLD AUTO: 1.61 X10(3) UL (ref 1–4)
LYMPHOCYTES NFR BLD AUTO: 25.8 %
MCH RBC QN AUTO: 30.6 PG (ref 26–34)
MCHC RBC AUTO-ENTMCNC: 34 G/DL (ref 31–37)
MCV RBC AUTO: 89.7 FL
MONOCYTES # BLD AUTO: 0.36 X10(3) UL (ref 0.1–1)
MONOCYTES NFR BLD AUTO: 5.8 %
NEUTROPHILS # BLD AUTO: 4.16 X10 (3) UL (ref 1.5–7.7)
NEUTROPHILS # BLD AUTO: 4.16 X10(3) UL (ref 1.5–7.7)
NEUTROPHILS NFR BLD AUTO: 66.7 %
NITRITE UR QL STRIP.AUTO: NEGATIVE
OSMOLALITY SERPL CALC.SUM OF ELEC: 286 MOSM/KG (ref 275–295)
PH UR STRIP.AUTO: 7 [PH] (ref 5–8)
PLATELET # BLD AUTO: 258 10(3)UL (ref 150–450)
POTASSIUM SERPL-SCNC: 4.1 MMOL/L (ref 3.5–5.1)
PROT SERPL-MCNC: 7.8 G/DL (ref 5.7–8.2)
PROT UR STRIP.AUTO-MCNC: NEGATIVE MG/DL
RBC # BLD AUTO: 4.68 X10(6)UL
RBC UR QL AUTO: NEGATIVE
SODIUM SERPL-SCNC: 138 MMOL/L (ref 136–145)
SP GR UR STRIP.AUTO: 1.02 (ref 1–1.03)
UROBILINOGEN UR STRIP.AUTO-MCNC: NORMAL MG/DL
WBC # BLD AUTO: 6.2 X10(3) UL (ref 4–11)

## 2024-12-20 PROCEDURE — 96374 THER/PROPH/DIAG INJ IV PUSH: CPT

## 2024-12-20 PROCEDURE — 81025 URINE PREGNANCY TEST: CPT

## 2024-12-20 PROCEDURE — 83690 ASSAY OF LIPASE: CPT | Performed by: EMERGENCY MEDICINE

## 2024-12-20 PROCEDURE — 96361 HYDRATE IV INFUSION ADD-ON: CPT

## 2024-12-20 PROCEDURE — 81003 URINALYSIS AUTO W/O SCOPE: CPT | Performed by: EMERGENCY MEDICINE

## 2024-12-20 PROCEDURE — 74177 CT ABD & PELVIS W/CONTRAST: CPT | Performed by: EMERGENCY MEDICINE

## 2024-12-20 PROCEDURE — 99284 EMERGENCY DEPT VISIT MOD MDM: CPT

## 2024-12-20 PROCEDURE — 96375 TX/PRO/DX INJ NEW DRUG ADDON: CPT

## 2024-12-20 PROCEDURE — 99285 EMERGENCY DEPT VISIT HI MDM: CPT

## 2024-12-20 PROCEDURE — 80053 COMPREHEN METABOLIC PANEL: CPT | Performed by: EMERGENCY MEDICINE

## 2024-12-20 PROCEDURE — 85025 COMPLETE CBC W/AUTO DIFF WBC: CPT | Performed by: EMERGENCY MEDICINE

## 2024-12-20 RX ORDER — ONDANSETRON 2 MG/ML
4 INJECTION INTRAMUSCULAR; INTRAVENOUS ONCE
Status: COMPLETED | OUTPATIENT
Start: 2024-12-20 | End: 2024-12-20

## 2024-12-20 RX ORDER — ONDANSETRON 4 MG/1
4 TABLET, ORALLY DISINTEGRATING ORAL EVERY 4 HOURS PRN
Qty: 10 TABLET | Refills: 0 | Status: SHIPPED | OUTPATIENT
Start: 2024-12-20 | End: 2024-12-27

## 2024-12-20 RX ORDER — DICYCLOMINE HCL 20 MG
20 TABLET ORAL 4 TIMES DAILY PRN
Qty: 30 TABLET | Refills: 0 | Status: SHIPPED | OUTPATIENT
Start: 2024-12-20 | End: 2025-01-19

## 2024-12-20 RX ORDER — HYDROMORPHONE HYDROCHLORIDE 1 MG/ML
0.5 INJECTION, SOLUTION INTRAMUSCULAR; INTRAVENOUS; SUBCUTANEOUS ONCE
Status: COMPLETED | OUTPATIENT
Start: 2024-12-20 | End: 2024-12-20

## 2024-12-20 NOTE — ED INITIAL ASSESSMENT (HPI)
PT PRESENTS TO ED WITH SYMPTOMS OF PANCREATITIS, HAS HX OF AUTOIMMUNE PANCREATITIS.  HAS ABD PAIN, DIARRHEA AND NAUSEA, ALSO HAS BELCHING.  PT DENIES FEVERS, DENIES BLOOD OR VOMIT IN STOOL.

## 2024-12-20 NOTE — ED PROVIDER NOTES
Patient Seen in: [facility] Emergency Department      History     Chief Complaint   Patient presents with    Abdomen/Flank Pain     URQ pain     Stated Complaint: abd. pain    Subjective:   HPI      40-year-old male past medical history of recurrent pancreatitis presents to ED with complaints of abdominal pain.  Reports that she is similar symptoms as her previous episodes except she now has occasional oily diarrhea which usually she has recurrent likely diarrhea.  Denies any fever chills body aches complaining of right upper quadrant abdominal pain rating to her flank which is typical of her previous presentations.  Reports history of cholecystectomy in the past.  Complaining of nausea denies any vomiting.  Reports that she has had alcohol in the past week.  Reports over the weekend she did have 1 glass on Saturday and 3 mimosas on .  Patient reports that she is not a heavy drinker only drinks very rarely on social occasions.  Also reports that she was diagnosed with autoimmune pancreatitis at [facility].  Currently follows a GI at [facility].    Objective:     Past Medical History:    Abdominal pain    Acute pancreatitis (HCC)    ERCP with [facility] for SOD  Dr. Michaud    ANXIETY    Anxiety state, unspecified    Autoimmune pancreatitis (HCC)    s/p Prednisone; followed with ERCP seen by [facility] (Dr. Su)    Back pain    Body piercing    CERVICAL DYSPLASIA    cin2-3    Decorative tattoo    DEPRESSION    Depression    Diarrhea, unspecified    History of depression    HOSPITALIZATIONS    auto-immune pancreatitis - has been hopitalized serveral times in the last 2.5 years    Hypothyroidism    Sees Dr Romano    Irregular bowel habits    KIDNEY STONE    Kidney stone    18: passed kidney stone 2 weeks after delivery.      (normal spontaneous vaginal delivery) (HCC)    Seasonal allergies    Wears glasses              Past Surgical History:   Procedure Laterality Date    Cholecystectomy       Colonoscopy  2012    [facility]l    Ct abdomen and pelvis with contrast  09/10/2019    Cystoscopy,remv calculus,complic Left 09/10/2019    Cystoscopy, left ureteroscopy, retrograde pyleogram, stone extraction, stent insetion    Ercp,diagnostic      Ir ureter tube Left 09/10/2019    6x24 stent extraction    Leep  2003    cin2-3    Other      ankle surgery ~     Other      bunion surgery -     Other      left knee scope -     Other surgical history      neck lipoma-benign    Tonsillectomy                  Social History     Socioeconomic History    Marital status:    Tobacco Use    Smoking status: Former     Current packs/day: 0.00     Types: Cigarettes     Quit date: 2010     Years since quittin.9    Smokeless tobacco: Never    Tobacco comments:     smoked less than half a pack per day   Vaping Use    Vaping status: Never Used   Substance and Sexual Activity    Alcohol use: No     Alcohol/week: 0.0 standard drinks of alcohol    Drug use: Yes     Types: Cannabis     Comment: GUMMIES DAILY    Sexual activity: Yes     Partners: Male     Comment: 2021                  Physical Exam     ED Triage Vitals [24 0845]   /73   Pulse 76   Resp 17   Temp 97.7 °F (36.5 °C)   Temp src Temporal   SpO2 96 %   O2 Device None (Room air)       Current Vitals:   Vital Signs  BP: 124/78  Pulse: 59  Resp: 17  Temp: 97.7 °F (36.5 °C)  Temp src: Temporal  MAP (mmHg): 89    Oxygen Therapy  SpO2: 99 %  O2 Device: None (Room air)        Physical Exam  Vitals and nursing note reviewed.   Constitutional:       Appearance: She is well-developed.   HENT:      Head: Normocephalic and atraumatic.   Eyes:      Pupils: Pupils are equal, round, and reactive to light.   Cardiovascular:      Rate and Rhythm: Normal rate and regular rhythm.   Pulmonary:      Effort: Pulmonary effort is normal.      Breath sounds: Normal breath sounds.   Abdominal:      General: Bowel sounds are normal.      Palpations: Abdomen  is soft.   Musculoskeletal:      Cervical back: Normal range of motion and neck supple.   Skin:     General: Skin is warm and dry.      Capillary Refill: Capillary refill takes less than 2 seconds.   Neurological:      Mental Status: She is alert and oriented to person, place, and time.   Psychiatric:         Behavior: Behavior normal.           ED Course     Labs Reviewed   LIPASE - Abnormal; Notable for the following components:       Result Value    Lipase 96 (*)     All other components within normal limits   COMP METABOLIC PANEL (14) - Normal   POCT PREGNANCY URINE - Normal   CBC WITH DIFFERENTIAL WITH PLATELET   URINALYSIS WITH CULTURE REFLEX   RAINBOW DRAW LAVENDER   RAINBOW DRAW LIGHT GREEN   RAINBOW DRAW BLUE   RAINBOW DRAW GOLD            CT ABDOMEN+PELVIS(CONTRAST ONLY)(CPT=74177)    Result Date: 12/20/2024  CONCLUSION:  1. Mild mucosal hyperenhancement in transverse and descending colon could be in part related to nondistention although mild colitis could have this appearance. 2. There is no free air, free fluid or abscess. 3. There is otherwise no acute abnormality.   LOCATION:  [facility]   Dictated by (CST): Rao Broussard MD on 12/20/2024 at 11:23 AM     Finalized by (CST): Rao Broussard MD on 12/20/2024 at 11:26 AM             MDM      39-year-old female presents ED with complaints of abdominal pain and diarrhea.  Vital stable arrival patient appears nontoxic examination.  She does have tenderness palpation in the right upper quadrant no rebound guarding rigidity.  Otherwise benign abdomen.  History of cholecystectomy in the past.  Will obtain basic lab work as well as CT abdomen to rule out acute pancreatitis versus diverticulitis versus colitis versus acute appendicitis.  CBC and electrolytes reviewed lipase is mildly elevated.  CT abdomen pelvis obtained shows mild mucosal hyperenhancement the transverse and descending colon.  Possibly colitis.  Patient's white count is within normal and she is  afebrile and she has not had any recent antibiotics or exposure to C. difficile.  Unlikely C. difficile colitis.  Discussed supportive care with Bentyl for abdominal pain cramping increase oral hydration and Imodium for severe diarrhea.  Also discussed ondansetron for any nausea.  Close follow-up recommended strict return precautions instructed.  Patient shows understand the plan and is in agreement plan discharged home in stable condition.        Medical Decision Making      Disposition and Plan     Clinical Impression:  1. Colitis         Disposition:  Discharge  12/20/2024 11:49 am    Follow-up:  No follow-up provider specified.        Medications Prescribed:  Current Discharge Medication List        START taking these medications    Details   dicyclomine 20 MG Oral Tab Take 1 tablet (20 mg total) by mouth 4 (four) times daily as needed (Abdominal pain or cramping).  Qty: 30 tablet, Refills: 0      !! ondansetron 4 MG Oral Tablet Dispersible Take 1 tablet (4 mg total) by mouth every 4 (four) hours as needed for Nausea.  Qty: 10 tablet, Refills: 0       !! - Potential duplicate medications found. Please discuss with provider.              Supplementary Documentation:

## 2024-12-20 NOTE — DISCHARGE INSTRUCTIONS
Please follow-up with your primary care physician 1-2 days return to the ER if your symptoms worsen progress or if you have any further concerns.  Please take dicyclomine as prescribed as needed for abdominal pain and cramping.  Please take ondansetron as prescribed as needed for nausea and vomiting.  He can also take Imodium 2 mg every 3 hours as needed for  diarrhea.  If you develop fever or worsening symptoms like worsening pain or black or bloody stools return to the ER for further evaluation.   25-Aug-2019 22:00

## 2024-12-22 ENCOUNTER — TELEPHONE (OUTPATIENT)
Dept: CASE MANAGEMENT | Facility: HOSPITAL | Age: 39
End: 2024-12-22

## 2024-12-22 NOTE — CM/SW NOTE
EDCM received call that patient never received printed prescriptions upon discharge from the ER. Both prescriptions called into the requesting [place] pharmacy at [address] in [place].

## 2025-01-22 ENCOUNTER — HOSPITAL ENCOUNTER (EMERGENCY)
Age: 40
Discharge: HOME | End: 2025-01-22
Attending: EMERGENCY MEDICINE
Payer: COMMERCIAL

## 2025-01-22 ENCOUNTER — APPOINTMENT (OUTPATIENT)
Dept: GENERAL RADIOLOGY | Age: 40
End: 2025-01-22
Payer: COMMERCIAL

## 2025-01-22 VITALS
WEIGHT: 270 LBS | OXYGEN SATURATION: 98 % | RESPIRATION RATE: 16 BRPM | HEIGHT: 70 IN | TEMPERATURE: 98 F | DIASTOLIC BLOOD PRESSURE: 75 MMHG | SYSTOLIC BLOOD PRESSURE: 110 MMHG | HEART RATE: 70 BPM | BODY MASS INDEX: 38.65 KG/M2

## 2025-01-22 DIAGNOSIS — S82.891A AVULSION FRACTURE OF ANKLE, RIGHT, CLOSED, INITIAL ENCOUNTER: Primary | ICD-10-CM

## 2025-01-22 DIAGNOSIS — S99.911A INJURY OF RIGHT ANKLE, INITIAL ENCOUNTER: ICD-10-CM

## 2025-01-22 PROCEDURE — 29515 APPLICATION SHORT LEG SPLINT: CPT

## 2025-01-22 PROCEDURE — 99284 EMERGENCY DEPT VISIT MOD MDM: CPT

## 2025-01-22 PROCEDURE — 73610 X-RAY EXAM OF ANKLE: CPT

## 2025-01-22 RX ORDER — IBUPROFEN 600 MG/1
600 TABLET, FILM COATED ORAL ONCE
Status: COMPLETED | OUTPATIENT
Start: 2025-01-22 | End: 2025-01-22

## 2025-01-22 NOTE — ED PROVIDER NOTES
Patient Seen in: [facility] In [place]      History     Chief Complaint   Patient presents with    Fall    Ankle Injury     Stated Complaint: s/p fall on wet snow and right ankle injury    Subjective:   HPI    38 YO female with below stated past medical history presents to emergency department for evaluation of right ankle pain after slipping on ice a few hours prior to arrival. Braced fall with hands. Complains only of right ankle pain. Wilkes a crack to the right ankle. Denies hitting her head, LOC, neck pain, chest pain, SOB or any other pain sites at this time.  Ibuprofen given here has significantly helped pain.        Objective:     Past Medical History:    Abdominal pain    Acute pancreatitis (HCC)    ERCP with [facility] for SOD  Dr. Michaud    ANXIETY    Anxiety state, unspecified    Autoimmune pancreatitis (HCC)    s/p Prednisone; followed with [facility]CP seen by [facility] (Dr. Su)    Back pain    Body piercing    CERVICAL DYSPLASIA    cin2-3    Decorative tattoo    DEPRESSION    Depression    Diarrhea, unspecified    History of depression    HOSPITALIZATIONS    auto-immune pancreatitis - has been hopitalized serveral times in the last 2.5 years    Hypothyroidism    Sees Dr Romano    Irregular bowel habits    KIDNEY STONE    Kidney stone    18: passed kidney stone 2 weeks after delivery.      (normal spontaneous vaginal delivery) ([facility])    Seasonal allergies    Wears glasses              Past Surgical History:   Procedure Laterality Date    Cholecystectomy      Colonoscopy  2012    wnl    Ct abdomen and pelvis with contrast  09/10/2019    Cystoscopy,remv calculus,complic Left 09/10/2019    Cystoscopy, left ureteroscopy, retrograde pyleogram, stone extraction, stent insetion    Ercp,diagnostic      Ir ureter tube Left 09/10/2019    6x24 stent extraction    Leep  2003    cin2-3    Other      ankle surgery ~     Other      bunion surgery -     Other      left knee scope  - 2001    Other surgical history  1996    neck lipoma-benign    Tonsillectomy                  Social History     Socioeconomic History    Marital status:    Tobacco Use    Smoking status: Former     Current packs/day: 0.00     Types: Cigarettes     Quit date: 1/1/2010     Years since quitting: 15.0    Smokeless tobacco: Never    Tobacco comments:     smoked less than half a pack per day   Vaping Use    Vaping status: Never Used   Substance and Sexual Activity    Alcohol use: No     Alcohol/week: 0.0 standard drinks of alcohol    Drug use: Yes     Types: Cannabis     Comment: GUMMIES DAILY    Sexual activity: Yes     Partners: Male     Comment: 1/11/2021                  Physical Exam     ED Triage Vitals [01/22/25 1116]   /75   Pulse 70   Resp 16   Temp 97.7 °F (36.5 °C)   Temp src Temporal   SpO2 98 %   O2 Device None (Room air)       Current Vitals:   Vital Signs  BP: 110/75  Pulse: 70  Resp: 16  Temp: 97.7 °F (36.5 °C)  Temp src: Temporal    Oxygen Therapy  SpO2: 98 %  O2 Device: None (Room air)        Physical Exam  Vitals and nursing note reviewed.   Constitutional:       General: She is not in acute distress.     Appearance: Normal appearance. She is not ill-appearing, toxic-appearing or diaphoretic.   Cardiovascular:      Rate and Rhythm: Normal rate.   Musculoskeletal:      Right hip: No tenderness.      Left hip: No tenderness.      Right upper leg: No deformity or tenderness.      Right knee: Normal range of motion. No tenderness.      Right lower leg: No deformity or tenderness.      Right ankle: Swelling (lateral malleolus) present. Tenderness present over the lateral malleolus.      Right foot: Normal range of motion and normal capillary refill. No swelling or tenderness. Normal pulse.   Neurological:      Mental Status: She is alert.       ED Course   Labs Reviewed - No data to display  XR ANKLE (MIN 3 VIEWS), RIGHT (CPT=73610)    Result Date: 1/22/2025  PROCEDURE:  XR ANKLE (MIN 3  VIEWS), RIGHT (CPT=73610)  TECHNIQUE:  Three views were obtained.  COMPARISON:  None.  INDICATIONS:  s/p fall on wet snow and right ankle injury  PATIENT STATED HISTORY: (As transcribed by Technologist)  Patient slipped on wet snow in a parking lot about 1 hour PTA today, and rolled her right ankle.  She has pain and swelling to the lateral aspect of the ankle.  The pain radiates around the ankle  joint.  Patient is unable to weight bear.  She has had prior surgery to this ankle in 2004 for ligamental repair.               CONCLUSION:  There is an avulsion fracture involving the distal tip of the lateral malleolus.  Soft tissue swelling about the right ankle.  No dislocation.   LOCATION:  [id number]   Dictated by (CST): Wendy Becerril MD on 1/22/2025 at 11:51 AM     Finalized by (CST): Wendy Becerril MD on 1/22/2025 at 11:51 AM        I independently reviewed films.  Avulsion fracture to the distal end of the lateral malleolus.     MDM      Differential diagnosis considered but not limited to contusion, sprain/strain, fracture    Swelling and tenderness to the right lateral malleolus.  X-ray of right ankle shows avulsion fracture involving the distal tip of the lateral malleolus.  Short leg splint applied.  Patient states she already has crutches at home and politely declines here.  She is in agreement to continue follow-up with orthopedics.  She feels most comfortable continuing ibuprofen for pain management at home.      Medical Decision Making  Amount and/or Complexity of Data Reviewed  Radiology: ordered and independent interpretation performed. Decision-making details documented in ED Course.    Risk  OTC drugs.        Disposition and Plan     Clinical Impression:  1. Avulsion fracture of ankle, right, closed, initial encounter    2. Injury of right ankle, initial encounter         Disposition:  Discharge  1/22/2025 12:50 pm    Follow-up:  Niraj Figueroa MD  [facility] [address]  [facility]  [id number]  [phone]    Schedule an appointment as soon as possible for a visit            Medications Prescribed:  Current Discharge Medication List              Supplementary Documentation: